# Patient Record
Sex: MALE | Race: BLACK OR AFRICAN AMERICAN | NOT HISPANIC OR LATINO | Employment: FULL TIME | URBAN - METROPOLITAN AREA
[De-identification: names, ages, dates, MRNs, and addresses within clinical notes are randomized per-mention and may not be internally consistent; named-entity substitution may affect disease eponyms.]

---

## 2020-04-13 ENCOUNTER — HOSPITAL ENCOUNTER (EMERGENCY)
Facility: HOSPITAL | Age: 45
Discharge: HOME | End: 2020-04-13
Attending: EMERGENCY MEDICINE
Payer: COMMERCIAL

## 2020-04-13 VITALS
BODY MASS INDEX: 45.1 KG/M2 | TEMPERATURE: 99 F | HEART RATE: 95 BPM | HEIGHT: 70 IN | RESPIRATION RATE: 18 BRPM | WEIGHT: 315 LBS | OXYGEN SATURATION: 99 % | DIASTOLIC BLOOD PRESSURE: 95 MMHG | SYSTOLIC BLOOD PRESSURE: 143 MMHG

## 2020-04-13 DIAGNOSIS — I10 UNCONTROLLED HYPERTENSION: ICD-10-CM

## 2020-04-13 DIAGNOSIS — H10.33 ACUTE CONJUNCTIVITIS OF BOTH EYES, UNSPECIFIED ACUTE CONJUNCTIVITIS TYPE: Primary | ICD-10-CM

## 2020-04-13 PROCEDURE — 25000003 PHARM REV CODE 250: Performed by: EMERGENCY MEDICINE

## 2020-04-13 PROCEDURE — 25000003 PHARM REV CODE 250: Performed by: PHYSICIAN ASSISTANT

## 2020-04-13 PROCEDURE — 99284 PR EMERGENCY DEPT VISIT,LEVEL IV: ICD-10-PCS | Mod: ,,, | Performed by: PHYSICIAN ASSISTANT

## 2020-04-13 PROCEDURE — 99283 EMERGENCY DEPT VISIT LOW MDM: CPT

## 2020-04-13 PROCEDURE — 99284 EMERGENCY DEPT VISIT MOD MDM: CPT | Mod: ,,, | Performed by: PHYSICIAN ASSISTANT

## 2020-04-13 RX ORDER — ERYTHROMYCIN 5 MG/G
OINTMENT OPHTHALMIC EVERY 8 HOURS
Qty: 1 TUBE | Refills: 0 | Status: SHIPPED | OUTPATIENT
Start: 2020-04-13 | End: 2020-04-18

## 2020-04-13 RX ORDER — ERYTHROMYCIN 5 MG/G
OINTMENT OPHTHALMIC
Status: COMPLETED | OUTPATIENT
Start: 2020-04-13 | End: 2020-04-13

## 2020-04-13 RX ORDER — TETRACAINE HYDROCHLORIDE 5 MG/ML
2 SOLUTION OPHTHALMIC
Status: DISCONTINUED | OUTPATIENT
Start: 2020-04-13 | End: 2020-04-13

## 2020-04-13 RX ORDER — PROPARACAINE HYDROCHLORIDE 5 MG/ML
1 SOLUTION/ DROPS OPHTHALMIC
Status: DISCONTINUED | OUTPATIENT
Start: 2020-04-13 | End: 2020-04-14 | Stop reason: HOSPADM

## 2020-04-13 RX ORDER — AMLODIPINE BESYLATE 5 MG/1
5 TABLET ORAL DAILY
COMMUNITY

## 2020-04-13 RX ORDER — AMLODIPINE BESYLATE 5 MG/1
5 TABLET ORAL
Status: COMPLETED | OUTPATIENT
Start: 2020-04-13 | End: 2020-04-13

## 2020-04-13 RX ADMIN — ERYTHROMYCIN 1 INCH: 5 OINTMENT OPHTHALMIC at 10:04

## 2020-04-13 RX ADMIN — AMLODIPINE BESYLATE 5 MG: 5 TABLET ORAL at 10:04

## 2020-04-14 NOTE — ED PROVIDER NOTES
Encounter Date: 4/13/2020       History     Chief Complaint   Patient presents with    Conjunctivitis     to both eyes, L first then spread to R eye. been using abx drops since Friday but reports worsening itching to both eyes. denies vision changes     The patient presents to the ER c/o redness, drainage, and irritation to bilateral eyes for the past 3 days. He does not wear contacts. He denies any known FB or splash to the eyes. He states that he woke up with left eye irritation and matting 3 days ago and that the following morning he woke up with right eye symptoms as well. He states that he called his PCP, who called him in a Rx for Polymixin B Trimethoprim eye solution. He has been using the drops as prescribed, but denies any improvement. He states that his eyes have been crusted and itching. He has been rubbing them. He denies any vision change or loss. He denies any additional symptoms. He denies any known sick contacts.           Review of patient's allergies indicates:   Allergen Reactions    Sulfa (sulfonamide antibiotics)      Past Medical History:   Diagnosis Date    HTN (hypertension)     Kidney stone      Past Surgical History:   Procedure Laterality Date    FRACTURE SURGERY      left arm     No family history on file.  Social History     Tobacco Use    Smoking status: Never Smoker    Smokeless tobacco: Never Used   Substance Use Topics    Alcohol use: Yes     Comment: minimal     Drug use: No     Review of Systems   Constitutional: Negative for chills and fever.   HENT: Negative for congestion, facial swelling and sore throat.    Eyes: Positive for pain, discharge, redness and itching. Negative for visual disturbance.   Respiratory: Negative for cough, chest tightness and shortness of breath.    Cardiovascular: Negative for chest pain.   Gastrointestinal: Negative for abdominal pain, diarrhea, nausea and vomiting.   Endocrine: Negative for polydipsia and polyuria.   Genitourinary: Negative  for decreased urine volume.   Musculoskeletal: Negative for arthralgias and myalgias.   Skin: Negative for color change and rash.   Allergic/Immunologic: Negative for immunocompromised state.   Neurological: Negative for dizziness, light-headedness and headaches.   Psychiatric/Behavioral: Negative for confusion.       Physical Exam     Initial Vitals [04/13/20 1954]   BP Pulse Resp Temp SpO2   (!) 160/108 (!) 111 16 98.5 °F (36.9 °C) 99 %      MAP       --         Physical Exam    Nursing note and vitals reviewed.  Constitutional: He appears well-developed and well-nourished. He is not diaphoretic. He appears distressed.   HENT:   Head: Normocephalic.   Mouth/Throat: Oropharynx is clear and moist.   Eyes: EOM are normal. Pupils are equal, round, and reactive to light. Right eye exhibits discharge. Left eye exhibits discharge.   There is diffuse injection noted bilaterally. There is drainage/matting to bilateral lashes noted. No periorbital swelling or erythema. No proptosis. Conjugate gaze. No obvious FB or ulcer. No hemorrhage. He denies any pain with occular movement.    Cardiovascular: Normal rate.   Pulmonary/Chest: No respiratory distress.   Abdominal: Soft.   Musculoskeletal: Normal range of motion.   Neurological: He is alert and oriented to person, place, and time. He has normal strength. No sensory deficit.   Skin: Skin is warm and dry. No rash noted. No erythema.   Psychiatric: He has a normal mood and affect.             ED Course   Eye exam   Date/Time: 4/14/2020 12:44 AM  Performed by: Michele Jose PA-C  Authorized by: Tee Leon III, MD   Body area: eye  Anesthesia: local infiltration    Anesthesia:  Local Anesthetic: tetracaine drops  Eye examined with fluorescein.  No fluorescein uptake.  No residual rust ring present.  Dressing: antibiotic ointment  Patient tolerance: Patient tolerated the procedure well with no immediate complications  Comments: The patient reported immediate and  complete relief of bilateral eye pain with application of tetracaine drops. No Fluorescein uptake. No FB or ulcer. Lids everted for exam. Eyes copiously irrigated with normal saline. Erythromycin ophthalmic ointment applied in the ER.       Labs Reviewed - No data to display       Imaging Results    None          Medical Decision Making:   Initial Assessment:   Bilateral eye redness, irritation, drainage, matting, and itching x 3 days.   Differential Diagnosis:   Conjunctivitis, Keratitis, Uveitis, Iritis, Herpes, corneal abrasion, Foreign body, Corneal ulcer, Shingles, Subconjunctival hemorrhage, Glaucoma, etc   ED Management:  Eye exam completed   Eyes irrigated   VA 20/20 OU   Erythromycin ointment applied in ED and remainder provided with proper instructions on use, in addition to Rx for same   Pt advised to DC Polymixin B drops   Pt advised to stop rubbing eyes   Pt given follow up instructions   Pt advised to take OTC analgesic for any pain   Pt instructed to return to the ER if unimproved or if worse   Pt informed that his BP reading is significantly elevated. He states that he was recently diagnosed with HTN and started on Amlodipine 2 weeks ago.                                  Clinical Impression:       ICD-10-CM ICD-9-CM   1. Acute conjunctivitis of both eyes, unspecified acute conjunctivitis type H10.33 372.00   2. Uncontrolled hypertension I10 401.9         Disposition:   Disposition: Discharged  Condition: Stable     ED Disposition Condition    Discharge Stable        ED Prescriptions     Medication Sig Dispense Start Date End Date Auth. Provider    erythromycin (ROMYCIN) ophthalmic ointment Place into both eyes every 8 (eight) hours. for 5 days 1 Tube 4/13/2020 4/18/2020 Michele Jose PA-C        Follow-up Information     Follow up With Specialties Details Why Contact Info [facility]  If symptoms worsen in any way or if unimproved. Discontinue polymixin  B eye drops as discussed. Follow up with your primary care physician in the next 1-2 days for re-evaluation and further management. [address]  [facility] [id number] [phone]                                     Michele Jose PA-C  04/14/20 0050

## 2020-04-14 NOTE — ED TRIAGE NOTES
Patient arrived to ED w 3 day CC of bilat pink eye. Denies any exposure to another person or any eye irritants. Denies CP/SOB/N/V and has normal physical exam w exception of eyes which are both red and irritated.   
.  No smoking, alcohol or illicit drug use.

## 2020-04-14 NOTE — PROGRESS NOTES
Emergency Department TeleTRIAGE Encounter Note      CHIEF COMPLAINT    Chief Complaint   Patient presents with    Conjunctivitis     to both eyes, L first then spread to R eye. been using abx drops since Friday but reports worsening itching to both eyes. denies vision changes       VITAL SIGNS   Initial Vitals [04/13/20 1954]   BP Pulse Resp Temp SpO2   (!) 160/108 (!) 111 16 98.5 °F (36.9 °C) 99 %      MAP       --            ALLERGIES    Review of patient's allergies indicates:   Allergen Reactions    Sulfa (sulfonamide antibiotics)        PROVIDER TRIAGE NOTE  The patient started having discomfort and itching to his left eye a few days ago.  He contacted his PCP and had Polytrim eyedrops phoned in to his pharmacy.  He has been applying one drop q.i.d. for the last few days.  The symptoms have worsened.  He is now having itching and pain to both eyes.  He reports no changes in his vision.  He does not wear glasses or contact lenses.  He denies fever, chills, nausea, vomiting.      ORDERS    Tetracaine eye drops, fluorescein strips.        Virtual Visit Note: The provider triage portion of this emergency department evaluation and documentation was performed via [employer], a HIPAA-compliant telemedicine application, in concert with a tele-presenter in the room. A face to face patient evaluation with one of my colleagues will occur once the patient is placed in an emergency department room.      DISCLAIMER: This note was prepared with [employer] voice recognition transcription software. Garbled syntax, mangled pronouns, and other bizarre constructions may be attributed to that software system.

## 2020-09-18 ENCOUNTER — TELEPHONE (OUTPATIENT)
Dept: BARIATRICS | Facility: CLINIC | Age: 45
End: 2020-09-18

## 2020-09-18 NOTE — TELEPHONE ENCOUNTER
----- Message from Jackeline Lancaster sent at 9/18/2020  9:01 AM CDT -----  Regarding: Appointment Access  Contact: [phone]  Pt calling to schedule an appointment with the Bariatric department. The pt stated that he has seen a bariatric provider before but they no longer accept his insurance. Pt stated that this is his second message that he has left. Can you please call the pt regarding the scheduling of his appointment.

## 2020-09-18 NOTE — TELEPHONE ENCOUNTER
Spoke with patient. States he has completed work up for bariatric surgery at [facility], but they don't accept his insurance. Scheduled consult appointment with DIANDRA and gave fax number for patient to fax over records prior to appointment. Notified patient  will verify coverage prior to appointment. Patient verbalized understanding.

## 2020-10-21 PROBLEM — Z00.00 ENCOUNTER FOR PREVENTIVE HEALTH EXAMINATION: Status: ACTIVE | Noted: 2020-10-21

## 2020-10-22 ENCOUNTER — NON-APPOINTMENT (OUTPATIENT)
Age: 45
End: 2020-10-22

## 2020-10-22 ENCOUNTER — APPOINTMENT (OUTPATIENT)
Dept: CARDIOLOGY | Facility: CLINIC | Age: 45
End: 2020-10-22
Payer: COMMERCIAL

## 2020-10-22 VITALS
BODY MASS INDEX: 40.8 KG/M2 | TEMPERATURE: 97.3 F | RESPIRATION RATE: 14 BRPM | HEIGHT: 70 IN | DIASTOLIC BLOOD PRESSURE: 75 MMHG | OXYGEN SATURATION: 97 % | HEART RATE: 99 BPM | SYSTOLIC BLOOD PRESSURE: 113 MMHG | WEIGHT: 285 LBS

## 2020-10-22 DIAGNOSIS — E55.9 VITAMIN D DEFICIENCY, UNSPECIFIED: ICD-10-CM

## 2020-10-22 DIAGNOSIS — R06.00 DYSPNEA, UNSPECIFIED: ICD-10-CM

## 2020-10-22 DIAGNOSIS — E11.9 TYPE 2 DIABETES MELLITUS W/OUT COMPLICATIONS: ICD-10-CM

## 2020-10-22 DIAGNOSIS — I10 ESSENTIAL (PRIMARY) HYPERTENSION: ICD-10-CM

## 2020-10-22 DIAGNOSIS — B20 HUMAN IMMUNODEFICIENCY VIRUS [HIV] DISEASE: ICD-10-CM

## 2020-10-22 DIAGNOSIS — Z78.9 OTHER SPECIFIED HEALTH STATUS: ICD-10-CM

## 2020-10-22 PROCEDURE — 93000 ELECTROCARDIOGRAM COMPLETE: CPT

## 2020-10-22 PROCEDURE — 99204 OFFICE O/P NEW MOD 45 MIN: CPT

## 2020-10-22 RX ORDER — DOLUTEGRAVIR SODIUM 50 MG/1
50 TABLET, FILM COATED ORAL DAILY
Refills: 0 | Status: ACTIVE | COMMUNITY
Start: 2020-10-22

## 2020-10-22 RX ORDER — AMLODIPINE BESYLATE 5 MG/1
5 TABLET ORAL
Refills: 0 | Status: ACTIVE | COMMUNITY

## 2020-10-22 RX ORDER — METFORMIN HYDROCHLORIDE 1000 MG/1
1000 TABLET, EXTENDED RELEASE ORAL
Refills: 0 | Status: ACTIVE | COMMUNITY

## 2020-10-22 RX ORDER — LOSARTAN POTASSIUM 50 MG/1
50 TABLET, FILM COATED ORAL
Refills: 0 | Status: ACTIVE | COMMUNITY

## 2020-10-22 RX ORDER — HYDROCHLOROTHIAZIDE 12.5 MG/1
12.5 TABLET ORAL
Refills: 0 | Status: ACTIVE | COMMUNITY

## 2020-10-22 RX ORDER — LIRAGLUTIDE 6 MG/ML
18 INJECTION SUBCUTANEOUS
Refills: 0 | Status: ACTIVE | COMMUNITY

## 2020-10-22 RX ORDER — INSULIN DETEMIR 100 [IU]/ML
100 INJECTION, SOLUTION SUBCUTANEOUS
Refills: 0 | Status: ACTIVE | COMMUNITY

## 2020-10-22 RX ORDER — EMTRICITABINE AND TENOFOVIR ALAFENAMIDE 200; 25 MG/1; MG/1
200-25 TABLET ORAL
Refills: 0 | Status: ACTIVE | COMMUNITY

## 2020-10-22 RX ORDER — MULTIVIT-MIN/FOLIC/VIT K/LYCOP 400-300MCG
50 MCG TABLET ORAL
Refills: 0 | Status: ACTIVE | COMMUNITY

## 2020-10-22 NOTE — DISCUSSION/SUMMARY
[Procedure Intermediate Risk] : the procedure risk is intermediate [Patient Intermediate Risk] : the patient is an intermediate risk [Additional Diagnostics Recommended] : additional diagnostics recommended [FreeTextEntry1] : The patient is a 45-year-old gentleman HIV, diabetes mellitus, hypertension pursuing gastric sleeve.\par #1 CV- ecg normal, ECHO and exercise stress test\par #2 Htn- amlodipine, losartan, hctz\par #3 DM- levimir, trulicity and metformin\par #4 HIV- stable on descovy and tivicay\par #5 General- final clearance for gastric sleeve pending above

## 2020-10-22 NOTE — HISTORY OF PRESENT ILLNESS
[Preoperative Visit] : for a medical evaluation prior to surgery [Scheduled Procedure ___] : a [unfilled] [Date of Surgery ___] : on [unfilled] [Surgeon Name ___] : surgeon: [unfilled] [FreeTextEntry1] : Adonis is a 45-year-old gentleman HIV, DM, Htn, who is pursuing gastric sleeve. He had sleep study yesterday. Started walking 3 x week for the last two months and still gets short of breath on exertion. No chest pain, palpitations, dizziness, or lightheadedness.

## 2020-10-22 NOTE — PHYSICAL EXAM
[General Appearance - Well Developed] : well developed [Normal Appearance] : normal appearance [Well Groomed] : well groomed [General Appearance - Well Nourished] : well nourished [No Deformities] : no deformities [General Appearance - In No Acute Distress] : no acute distress [Normal Conjunctiva] : the conjunctiva exhibited no abnormalities [Eyelids - No Xanthelasma] : the eyelids demonstrated no xanthelasmas [Normal Oral Mucosa] : normal oral mucosa [No Oral Pallor] : no oral pallor [No Oral Cyanosis] : no oral cyanosis [Normal Jugular Venous A Waves Present] : normal jugular venous A waves present [Normal Jugular Venous V Waves Present] : normal jugular venous V waves present [No Jugular Venous Mcallister A Waves] : no jugular venous mcallister A waves [Respiration, Rhythm And Depth] : normal respiratory rhythm and effort [Exaggerated Use Of Accessory Muscles For Inspiration] : no accessory muscle use [Auscultation Breath Sounds / Voice Sounds] : lungs were clear to auscultation bilaterally [Heart Rate And Rhythm] : heart rate and rhythm were normal [Heart Sounds] : normal S1 and S2 [Murmurs] : no murmurs present [Abdomen Soft] : soft [Abdomen Tenderness] : non-tender [Abdomen Mass (___ Cm)] : no abdominal mass palpated [Abnormal Walk] : normal gait [Gait - Sufficient For Exercise Testing] : the gait was sufficient for exercise testing [Nail Clubbing] : no clubbing of the fingernails [Cyanosis, Localized] : no localized cyanosis [Petechial Hemorrhages (___cm)] : no petechial hemorrhages [Skin Color & Pigmentation] : normal skin color and pigmentation [] : no rash [No Venous Stasis] : no venous stasis [Skin Lesions] : no skin lesions [No Skin Ulcers] : no skin ulcer [No Xanthoma] : no  xanthoma was observed [Oriented To Time, Place, And Person] : oriented to person, place, and time [Affect] : the affect was normal [Mood] : the mood was normal [No Anxiety] : not feeling anxious

## 2020-12-02 ENCOUNTER — APPOINTMENT (OUTPATIENT)
Dept: CARDIOLOGY | Facility: CLINIC | Age: 45
End: 2020-12-02

## 2021-04-15 ENCOUNTER — PATIENT MESSAGE (OUTPATIENT)
Dept: RESEARCH | Facility: HOSPITAL | Age: 46
End: 2021-04-15

## 2021-12-04 ENCOUNTER — IMMUNIZATION (OUTPATIENT)
Dept: PRIMARY CARE CLINIC | Facility: CLINIC | Age: 46
End: 2021-12-04
Payer: COMMERCIAL

## 2021-12-04 DIAGNOSIS — Z23 NEED FOR VACCINATION: Primary | ICD-10-CM

## 2021-12-04 PROCEDURE — 0034A COVID-19,VECTOR-NR,RS-AD26,PF,0.5 ML DOSE VACCINE (JANSSEN): CPT | Mod: CV19,PBBFAC | Performed by: INTERNAL MEDICINE

## 2021-12-04 PROCEDURE — 91303 COVID-19,VECTOR-NR,RS-AD26,PF,0.5 ML DOSE VACCINE (JANSSEN): CPT | Mod: PBBFAC | Performed by: INTERNAL MEDICINE

## 2025-04-04 ENCOUNTER — OFFICE VISIT (OUTPATIENT)
Dept: URGENT CARE | Facility: CLINIC | Age: 50
End: 2025-04-04
Payer: COMMERCIAL

## 2025-04-04 VITALS
SYSTOLIC BLOOD PRESSURE: 140 MMHG | BODY MASS INDEX: 34.65 KG/M2 | HEART RATE: 81 BPM | TEMPERATURE: 98 F | DIASTOLIC BLOOD PRESSURE: 87 MMHG | RESPIRATION RATE: 17 BRPM | WEIGHT: 242.06 LBS | HEIGHT: 70 IN | OXYGEN SATURATION: 99 %

## 2025-04-04 DIAGNOSIS — L02.01 ABSCESS OF FACE: Primary | ICD-10-CM

## 2025-04-04 DIAGNOSIS — L73.9 FOLLICULITIS: ICD-10-CM

## 2025-04-04 PROCEDURE — 87070 CULTURE OTHR SPECIMN AEROBIC: CPT | Performed by: FAMILY MEDICINE

## 2025-04-04 RX ORDER — HYDROCHLOROTHIAZIDE 12.5 MG/1
12.5 CAPSULE ORAL
COMMUNITY

## 2025-04-04 RX ORDER — EMTRICITABINE AND TENOFOVIR ALAFENAMIDE 200; 25 MG/1; MG/1
1 TABLET ORAL
COMMUNITY

## 2025-04-04 RX ORDER — CLINDAMYCIN PHOSPHATE 150 MG/ML
600 INJECTION, SOLUTION INTRAVENOUS
Status: COMPLETED | OUTPATIENT
Start: 2025-04-04 | End: 2025-04-04

## 2025-04-04 RX ORDER — TAMSULOSIN HYDROCHLORIDE 0.4 MG/1
1 CAPSULE ORAL NIGHTLY
COMMUNITY
Start: 2025-03-21 | End: 2026-03-21

## 2025-04-04 RX ORDER — DOXYCYCLINE HYCLATE 100 MG
100 TABLET ORAL 2 TIMES DAILY
Qty: 14 TABLET | Refills: 0 | Status: SHIPPED | OUTPATIENT
Start: 2025-04-04 | End: 2025-04-11

## 2025-04-04 RX ORDER — ONDANSETRON 8 MG/1
8 TABLET, ORALLY DISINTEGRATING ORAL EVERY 8 HOURS PRN
COMMUNITY
Start: 2025-03-22

## 2025-04-04 RX ORDER — HYDROCHLOROTHIAZIDE 25 MG/1
25 TABLET ORAL
COMMUNITY

## 2025-04-04 RX ORDER — AMLODIPINE BESYLATE 10 MG/1
10 TABLET ORAL
COMMUNITY

## 2025-04-04 RX ORDER — MUPIROCIN 20 MG/G
OINTMENT TOPICAL 2 TIMES DAILY
Qty: 22 G | Refills: 0 | Status: SHIPPED | OUTPATIENT
Start: 2025-04-04

## 2025-04-04 RX ORDER — DOLUTEGRAVIR SODIUM 50 MG/1
1 TABLET, FILM COATED ORAL
COMMUNITY

## 2025-04-04 RX ORDER — HYDROCHLOROTHIAZIDE 12.5 MG/1
12.5 TABLET ORAL DAILY
COMMUNITY

## 2025-04-04 RX ORDER — HYDROCODONE BITARTRATE AND ACETAMINOPHEN 5; 325 MG/1; MG/1
TABLET ORAL
COMMUNITY
Start: 2025-03-22

## 2025-04-04 RX ORDER — ERGOCALCIFEROL 1.25 MG/1
CAPSULE ORAL
COMMUNITY

## 2025-04-04 RX ORDER — LIRAGLUTIDE 6 MG/ML
1.8 INJECTION SUBCUTANEOUS
COMMUNITY

## 2025-04-04 RX ORDER — LOSARTAN POTASSIUM 50 MG/1
50 TABLET ORAL DAILY
COMMUNITY

## 2025-04-04 RX ADMIN — CLINDAMYCIN PHOSPHATE 600 MG: 150 INJECTION, SOLUTION INTRAVENOUS at 09:04

## 2025-04-04 NOTE — PROGRESS NOTES
"Subjective:      Patient ID: Félix Angela is a 50 y.o. male.    Vitals:  height is 5' 10" (1.778 m) and weight is 109.8 kg (242 lb 1 oz). His oral temperature is 98.4 °F (36.9 °C). His blood pressure is 140/87 (abnormal) and his pulse is 81. His respiration is 17 and oxygen saturation is 99%.     Chief Complaint: Abscess    Pt presents today w/ / 3 infected  possible ingrown hair on side of lt sife of face/neck area ; onset approx 4x days ago. Pt has 2 to 3cm , abscess and two approx 1cm abscess on lt side of face. Pt c/o warm to touch , tenderness, redness, moderate edema , burning sensation and yellowish drainage of the areas. Pt states he shaved in the are prior to sx. Pt tried ibuprofen for pain and warm compress to the area ;mild relief.     Abscess  Chronicity:  NewProgression Since Onset: gradually worsening  Size:  3-5cm  Location:  Head/neck  Associated Symptoms: no fever, no chills, no sweats  Characteristics: draining, painful, redness and swelling    Characteristics: no itching, no dryness, no scaling, no peeling, no bruising and no blistering    Pain Scale:  4/10  Treatments Tried:  Warm compresses and NSAIDs  Relieved by:  Nothing  Worsened by:  Nothing      Constitution: Negative for chills and fever.   Skin:  Positive for abscess.      Objective:     Physical Exam  Constitutional: Pt oriented to person, place, and time.  Non-toxic appearance.   Patient does not appear ill. No distress. normal  HENT: No icterus or facial swelling appreciated  Head: Normocephalic and atraumatic.   Nose: No congestion.   Pulmonary/Chest: Effort normal. No stridor. No respiratory distress.   Abdominal: Normal appearance. Abdomen exhibits no distension.   Musculoskeletal:         General: No swelling.   Neurological: no focal deficit. Patient is alert and oriented to person, place, and time.   Skin: 2 small subcetemeter erythematous indurated lesions on right jawline with one larger 1cm raised indurated lesion that " has small amt of purulent drainage from center.  After cleansing larger lesion with povidone swab area was injected with 2 cc 1% lidocaine with epi and manually expressed small amount of purulent drainage.  There still a good amount of induration present along the borders of lesion.  Aerobic wound culture obtained  Psychiatric: Patients behavior is normal. Mood, judgment and thought content normal.     Assessment:     1. Abscess of face    2. Folliculitis        Plan:       Abscess of face  Folliculitis    -     Ambulatory referral/consult to Dermatology if persisting   -     Aerobic culture      -     clindamycin injection 600 mg  -     doxycycline (VIBRA-TABS) 100 MG tablet; Take 1 tablet (100 mg total) by mouth 2 (two) times daily. for 7 days  Dispense: 14 tablet; Refill: 0  -     mupirocin (BACTROBAN) 2 % ointment; Apply topically 2 (two) times daily.  Dispense: 22 g; Refill: 0  -     Ambulatory referral/consult to Dermatology    Avoid shaving until completely resolves    RTC in 1-2 days for wound check

## 2025-04-04 NOTE — LETTER
April 4, 2025      [facility] [address] [address]  Phone: [phone]  Fax: [phone]       Patient: Félix Angela   YOB: 1975  Date of Visit: 04/04/2025    To Whom It May Concern:    Mr Daya Angela  was at [facility] on 04/04/2025. The patient may return to work/school on 4/5/25.  If you have any questions or concerns, or if I can be of further assistance, please do not hesitate to contact me.    Sincerely,    Elyse Ding, DO

## 2025-04-06 ENCOUNTER — RESULTS FOLLOW-UP (OUTPATIENT)
Dept: URGENT CARE | Facility: CLINIC | Age: 50
End: 2025-04-06